# Patient Record
Sex: MALE | Race: WHITE | NOT HISPANIC OR LATINO
[De-identification: names, ages, dates, MRNs, and addresses within clinical notes are randomized per-mention and may not be internally consistent; named-entity substitution may affect disease eponyms.]

---
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
## 2024-05-31 ENCOUNTER — WALK IN (OUTPATIENT)
Dept: URGENT CARE | Age: 24
End: 2024-05-31
Attending: EMERGENCY MEDICINE

## 2024-05-31 VITALS
TEMPERATURE: 97 F | HEART RATE: 90 BPM | OXYGEN SATURATION: 97 % | SYSTOLIC BLOOD PRESSURE: 144 MMHG | DIASTOLIC BLOOD PRESSURE: 78 MMHG | RESPIRATION RATE: 16 BRPM

## 2024-05-31 DIAGNOSIS — H61.23 HEARING LOSS OF BOTH EARS DUE TO CERUMEN IMPACTION: Primary | ICD-10-CM

## 2024-05-31 ASSESSMENT — PAIN SCALES - GENERAL: PAINLEVEL: 0

## 2024-07-08 ENCOUNTER — HOSPITAL ENCOUNTER (EMERGENCY)
Facility: CLINIC | Age: 24
Discharge: HOME | End: 2024-07-08
Attending: STUDENT IN AN ORGANIZED HEALTH CARE EDUCATION/TRAINING PROGRAM | Admitting: STUDENT IN AN ORGANIZED HEALTH CARE EDUCATION/TRAINING PROGRAM
Payer: COMMERCIAL

## 2024-07-08 ENCOUNTER — TELEPHONE (OUTPATIENT)
Dept: BEHAVIORAL HEALTH | Facility: CLINIC | Age: 24
End: 2024-07-08

## 2024-07-08 VITALS
OXYGEN SATURATION: 96 % | DIASTOLIC BLOOD PRESSURE: 81 MMHG | HEART RATE: 89 BPM | BODY MASS INDEX: 27.4 KG/M2 | TEMPERATURE: 98.1 F | WEIGHT: 202.3 LBS | HEIGHT: 72 IN | RESPIRATION RATE: 18 BRPM | SYSTOLIC BLOOD PRESSURE: 112 MMHG

## 2024-07-08 DIAGNOSIS — F41.0 GENERALIZED ANXIETY DISORDER WITH PANIC ATTACKS: ICD-10-CM

## 2024-07-08 DIAGNOSIS — F33.1 MAJOR DEPRESSIVE DISORDER, RECURRENT EPISODE, MODERATE (H): ICD-10-CM

## 2024-07-08 DIAGNOSIS — F41.1 GENERALIZED ANXIETY DISORDER WITH PANIC ATTACKS: ICD-10-CM

## 2024-07-08 PROBLEM — F41.9 ANXIETY: Status: ACTIVE | Noted: 2024-07-08

## 2024-07-08 LAB
ATRIAL RATE - MUSE: 90 BPM
DIASTOLIC BLOOD PRESSURE - MUSE: NORMAL MMHG
INTERPRETATION ECG - MUSE: NORMAL
P AXIS - MUSE: 49 DEGREES
PR INTERVAL - MUSE: 136 MS
QRS DURATION - MUSE: 80 MS
QT - MUSE: 330 MS
QTC - MUSE: 403 MS
R AXIS - MUSE: 98 DEGREES
SYSTOLIC BLOOD PRESSURE - MUSE: NORMAL MMHG
T AXIS - MUSE: 21 DEGREES
VENTRICULAR RATE- MUSE: 90 BPM

## 2024-07-08 PROCEDURE — 93005 ELECTROCARDIOGRAM TRACING: CPT

## 2024-07-08 PROCEDURE — 99283 EMERGENCY DEPT VISIT LOW MDM: CPT

## 2024-07-08 PROCEDURE — 99244 OFF/OP CNSLTJ NEW/EST MOD 40: CPT | Performed by: PSYCHIATRY & NEUROLOGY

## 2024-07-08 PROCEDURE — 250N000013 HC RX MED GY IP 250 OP 250 PS 637: Performed by: PSYCHIATRY & NEUROLOGY

## 2024-07-08 RX ORDER — ESCITALOPRAM OXALATE 5 MG/1
5 TABLET ORAL DAILY
Status: DISCONTINUED | OUTPATIENT
Start: 2024-07-08 | End: 2024-07-08 | Stop reason: HOSPADM

## 2024-07-08 RX ORDER — ESCITALOPRAM OXALATE 5 MG/1
5 TABLET ORAL DAILY
Qty: 30 TABLET | Refills: 0 | Status: SHIPPED | OUTPATIENT
Start: 2024-07-08

## 2024-07-08 RX ADMIN — ESCITALOPRAM OXALATE 5 MG: 5 TABLET, FILM COATED ORAL at 15:43

## 2024-07-08 ASSESSMENT — ACTIVITIES OF DAILY LIVING (ADL)
ADLS_ACUITY_SCORE: 35
HYGIENE/GROOMING: INDEPENDENT
ADLS_ACUITY_SCORE: 35

## 2024-07-08 ASSESSMENT — COLUMBIA-SUICIDE SEVERITY RATING SCALE - C-SSRS
1. IN THE PAST MONTH, HAVE YOU WISHED YOU WERE DEAD OR WISHED YOU COULD GO TO SLEEP AND NOT WAKE UP?: NO
6. HAVE YOU EVER DONE ANYTHING, STARTED TO DO ANYTHING, OR PREPARED TO DO ANYTHING TO END YOUR LIFE?: NO
2. HAVE YOU ACTUALLY HAD ANY THOUGHTS OF KILLING YOURSELF IN THE PAST MONTH?: NO

## 2024-07-08 ASSESSMENT — PATIENT HEALTH QUESTIONNAIRE - PHQ9: SUM OF ALL RESPONSES TO PHQ9 QUESTIONS 1 & 2: 3

## 2024-07-08 NOTE — ED NOTES
[facility]  ED to EMPATH Checklist:      Goal for EMPATH: Anxiety management    Current Behavior: Calm and Cooperative    Safety Concerns: None    Legal Hold Status: Voluntary    Medically Cleared by ED provider: Yes    Patient Therapeutically Searched: Not searched - Currently in triage    Belongings: Remain with patient    Independent Ambulation at Baseline: Yes/No: Yes    Participates in Care/Conversation: Yes/No: Yes    Patient Informed about EMPATH: Yes/No: Yes    DEC: Ordered and pending    Patient Ready to be Transferred to EMPATH? Yes/No: Yes

## 2024-07-08 NOTE — ED PROVIDER NOTES
EmPATH Unit - Psychiatric Consultation  [facility] Emergency Department    Bruce Arriaga MRN: [id number]   Age: 23 year old YOB: 2000     History     Chief Complaint   Patient presents with    Mental Health Problem     HPI  Bruce Arriaga is a 23 year old male with history notable for no prior psychiatric history who presents to the emergency department reporting concern for chest tightness and anxiety.  He was determined to be medically stable and transferred to the EmPATH unit for psychiatric assessment.  Records also indicate that the patient has been consuming alcoholic beverages more often than usual.  There did not appear to be concerns related to the risk of withdrawal or dependency related to alcohol.  His last alcoholic beverage was about 2 days ago.  He was determined to be medically stable then transferred to the EmPATH unit for psychiatric assessment.  He is now approaching 5 hours in the emergency department.  On examination, the patient confirms that his primary struggle is related to anxiety.  He notes that his anxiety significantly worsened a couple weeks ago following a relational discord.  He also notes that alcohol usage has increased since then.  He notes that during moments of high anxiety, he experiences unusual physical manifestations that make him worry about his general health, recently leading to an evaluation in an outside emergency department.  Medical stability was reassured at that time however he has continued to experience chest tightness and shortness of breath which further escalated his anxiety.  He characterized depressive symptoms although less impairing when compared to anxiety.  His mood has been sad although symptom severity is not present on a daily basis.  Vegetative symptoms and anhedonia tend to fluctuate as well.  These symptoms have gradually become more prominent over the past 1 week and coincide with worsening anxiety.  He denied suicidal and  homicidal thoughts.  He presents today as help seeking, open to antidepressant treatments and psychotherapy.  There was no indication of psychosis.    Past Medical History  No past medical history on file.  No past surgical history on file.  escitalopram (LEXAPRO) 5 MG tablet      No Known Allergies  Family History  No family history on file.  Social History           Review of Systems  A medically appropriate review of systems was performed with pertinent positives and negatives noted in the HPI, and all other systems negative.    Physical Examination   BP: (!) 144/89  Pulse: 93  Temp: 98.1  F (36.7  C)  Resp: 14  Height: 182.9 cm (6')  Weight: 90.7 kg (200 lb)  SpO2: 96 %    Physical Exam  General: Appears stated age.   Neuro: Alert and fully oriented. Extremities appear to demonstrate normal strength on visual inspection.   Integumentary/Skin: no rash visualized, normal color    Psychiatric Examination   Appearance: awake, alert  Attitude:  cooperative  Eye Contact:  fair  Mood:  anxious and sad   Affect:  mood congruent  Speech:  clear, coherent  Psychomotor Behavior:  no evidence of tardive dyskinesia, dystonia, or tics  Thought Process:  logical and linear  Associations:  no loose associations  Thought Content:  no evidence of suicidal ideation or homicidal ideation and no evidence of psychotic thought  Insight:  fair  Judgement:  fair  Oriented to:  time, person, and place  Attention Span and Concentration:  fair  Recent and Remote Memory:  fair  Language: able to name/identify objects without impairment  Fund of Knowledge: intact with awareness of current and past events    ED Course     ED Course as of 07/08/24 1501   Mon Jul 08, 2024   1040 I obtained history and examined the patient as noted above.         Labs Ordered and Resulted from Time of ED Arrival to Time of ED Departure - No data to display    Assessments & Plan (with Medical Decision Making)   Patient presenting with progressively worsening  anxiety and depressive symptoms in the context of a relational discord further complicated by increased alcohol consumption.  His treatment plan focused on educating the patient on ongoing mental health symptoms and initiating antidepressant treatments. Nursing notes reviewed noting no acute issues.     I have reviewed the assessment completed by the [facility].     Preliminary diagnosis:    ICD-10-CM    1. Major depressive disorder, recurrent episode, moderate (H)  F33.1       2. Generalized anxiety disorder with panic attacks  F41.1     F41.0            Treatment Plan:  -Begin Lexapro 5 mg daily targeting antianxiety and antidepressant treatment.  Risks and benefits were reviewed.  -Referral for outpatient mental health treatment including medication management and psychotherapy  -Vital signs are fairly normal with the exception of a slightly high blood pressure.  Given his reported usage of alcohol, the risk of a withdrawal syndrome should be low.  Continue to monitor and treat accordingly.  -I offered to extend his stay on the EmPATH unit however the patient would prefer to discharge home today and utilize outpatient treatment.  Therefore, we will proceed with discharge back to the community.    --  Padilla Portillo MD   [facility] EMERGENCY DEPT  EmPATH Unit       Padilla Portillo MD  07/08/24 1053

## 2024-07-08 NOTE — TELEPHONE ENCOUNTER
Rec'd referral via Teams: [3:07 PM] Avery Huerta, may I get a TC therapy/ psychiatry referral for a patient [id number] ? Feel free to call EmPATH to schedule with patient. [phone]  Patient Preferences:   Location - in person / virtual  Provider Gender - no gender preference   Insurance: [facility]  Patient Email: none on file  Patient Phone: [phone]  IF FOR PSYCHIATRY  Next Medication Management Appointment  Date: 7/24/2024  Time: 6PM  Provider: [employer]    Called EMPATH to schedule with patient. Explained TC services to patient. Patient scheduled for TC therapy 7/9/2024. Next available TC Psychiatry is 7/18/2024 and patient appt is 7/24/2024, declined TC Psychiatry as appt for long term provider is following week.     Merlyn Young  Transition Clinic Coordinator  07/08/24 3:24 PM

## 2024-07-08 NOTE — PROGRESS NOTES
Discharge instructions reviewed with patient including follow-up care plan. Educated on medication regime and advised not to stop prescribed medication without consulting their physician. Reviewed safety plan and outpatient resources/appointments.Verbalized understanding of discharge instructions. Denies SI. All belongings which where brought into the hospital have been returned to patient. Escorted off the unit at 16:51. Discharged to home via private transportation.

## 2024-07-08 NOTE — ED PROVIDER NOTES
Emergency Department Note      History of Present Illness     Chief Complaint  Mental Health Problem      HPI  Bruce Arriaga is a very pleasant 23 year old male presenting with anxiety and chest tightness. The patient reports that while trying to fall asleep last night, he started to feel lightheaded and tremulous, making him nervous to fall asleep. Upon exam, the patient is now endorsing a tightness in his chest as well. He adds that he has been feeling more anxious with less energy recently, and he just went through a difficult break up on top of work and financial stress. Last weekend, he had 30 alcoholic drinks over the course of two days, he had two sober days, and then had 2 drinks every day for three days after that. He has not consumed alcohol yesterday or today. The patient is not normally a daily drinker, and denies every having alcohol withdrawal. He went to a hospital in [place] a week ago for the same chest tightness, and received a negative workup. He now believes his symptoms may just be due to his increasing anxiety. The patient occasionally uses marijuana.     Independent Historian  None. Only the patient provided history.    Review of External Notes  None.    Past Medical History   Medical History and Problem List  No past medical history on file.    Medications  No current outpatient medications on file.    Surgical History   No past surgical history on file.    Physical Exam   Patient Vitals for the past 24 hrs:   BP Temp Temp src Pulse Resp SpO2 Height Weight   07/08/24 1141 (!) 146/107 98.1  F (36.7  C) -- 91 16 98 % 1.829 m (6') 91.8 kg (202 lb 4.8 oz)   07/08/24 1041 (!) 144/89 -- -- -- -- -- -- --   07/08/24 1038 -- 98.1  F (36.7  C) Temporal 93 14 96 % 1.829 m (6') 90.7 kg (200 lb)      Physical Exam   General: Alert, young male, appearing stated age, sitting in chair, answering questions appropriately, tearful  HENT: Atraumatic, normocephalic  Eyes: Extraocular movements  intact  Cardiovascular: Regular rate  Pulmonary: Easy work of breathing  Skin: Warm and dry  Neuro: Alert and oriented  Psych: Cooperative, sad affect, anxious mood, denies suicidal ideation    Diagnostics   Lab Results   Labs Ordered and Resulted from Time of ED Arrival to Time of ED Departure - No data to display    Imaging  No orders to display     EKG   ECG taken at 1050, ECG read at 1100  Normal sinus rhythm with sinus arrhythmia  Rightward axis  Nonspecific T wave abnormality [T wave inversions in leads III and II]  Rate 90 bpm. SC interval 136 ms. QRS duration 80 ms. QT/QTc 330/403 ms. P-R-T axes 49 98 21.  My interpretation    Independent Interpretation  None    ED Course    Medications Administered  Medications - No data to display    Procedures  Procedures   None performed    Discussion of Management  None    Social Determinants of Health adding to complexity of care  None.      ED Course  ED Course as of 07/08/24 1145   Mon Jul 08, 2024   1040 I obtained history and examined the patient as noted above.         Medical Decision Making / Diagnosis   CMS Diagnoses: None    MIPS     None    MDM  Patient presenting with anxiety and chest tightness .  Vital signs are stable.   Given patient had an evaluation for chest tightness at an outside hospital 7 days ago, is 23, with no medical problems, low suspicion for organic etiology of his chest tightness.  Did obtain EKG to evaluate for cardiac arrhythmia and this was reassuring.  Suspect patient's chest tightness is secondary to anxiety.  I feel the patient is medically clear for mental health assessment. They are appropriate for transfer to EmPath unit for further evaluation and management.    Critical Care time was 0 minutes for this patient excluding procedures.    Disposition  The patient was transferred to EmPATH.     ICD-10 Codes:    ICD-10-CM    1. Anxiety  F41.9         Scribe Disclosure:  I, Heather Santa, am serving as a scribe at 11:18 AM on 7/8/2024  to document services personally performed by Andrea Garcia MD based on my observations and the provider's statements to me.    Andrea Garcia MD  [employer]        Andrea Garcia MD  07/08/24 8298

## 2024-07-08 NOTE — DISCHARGE INSTRUCTIONS
Your Upcoming Appointment    Type: Therapy - Initial (In-Person)  Date: Tuesday, 7/16/2024  Time: 6:00 pm - 6:55 pm  Provider: Loretta Mejia MA  LMFT  Location: [facility], [address], [address], [place] [address]  Phone: [phone]  ---------------------------------------------------------------------  Type: Medication Mgmt - Initial (In-Person)  Date: Wednesday, 7/24/2024  Time: 6:00 pm - 7:00 pm  Provider: Kathryn SABILLON  CNP,PMHNP,RN  Location: [facility], [address] [place] [address], [address], [place] [address]  Phone: [phone]  Patient Instructions: Thank you for choosing [facility]! *Due to high demand, your appt date & time is only guaranteed after you speak with [facility] intake. Our intake team will attempt to contact you. If you do not hear from them within 24 hours, please call them at [phone] and tell them you are a [facility] referral. If you do not speak with our Intake Department and complete the necessary paperwork they send you, we cannot see you at your scheduled appointment time. Thank you for helping us to help you!    Transition Clinic Referral: We have completed a referral to the transition clinic for therapy and it is scheduled for 7/10/2024 at 12:30pm with Anne Marie. Please Note: This is a virtual visit; there is no need to come to the facility.         Mental health recommendations    Please follow-up with your outpatient team about your visit today.    2.  One of our care coordinators will reach out to you after your discharge.  If you have any questions about additional resources please call our coordinators at # [phone]    3.  Please use aftercare plan and already established coping skills and safety planning as needed.     4.  Please call 911 and/or return to the emergency department if her symptoms worsen or your safety becomes compromised.       The following DBT skills can assist me when: I want to act on your emotions and acting on them  will only make things worse, I am overwhelmed by my emotions, I want to try to be skillful    Reduce Extreme Emotion  QUICKLY:  Changing Your Body Chemistry       T:  Change your body Temperature to change your autonomic nervous system    Use Ice pack to calm yourself down FAST. Place ice pack underneath your eyes for a count of 30 seconds to initiate the divers reflex which will naturally calm down your heart rate and breathing.      I:  Intensely exercise to calm down a body revved up by emotion    Examples: running, walking fast, jumping, playing basketball, weight lifting, swimming, calisthenics, etc.      Engage in exercises that DO NOT include violent behaviors. Exercises that utilize violent behaviors tend to function as  behavioral rehearsal,  and rather than calming the person down, may actually  rev  the person up more, increasing the likelihood of violence, and lessening the likelihood that they will  burn off  energy     P:  Progressively relax your muscles    Starting with your hands, moving to your forearms, upper arms, shoulders, neck, forehead, eyes, cheeks and lips, tongue and teeth, chest, upper back, stomach, buttocks, thighs, calves, ankles, feet      Tense (10 seconds,   of the way), then relax each muscle (all the way)    Notice the tension    Notice the difference when relaxed (by tensing first, and then relaxing, you are able to get a more thorough relaxation than by simply relaxing)      P: Paced breathing to relax    The standard technique is to begin with counting the number of steps one takes for a typical inhale, then counting the steps one takes for a typical exhale, and then lengthening the amount of steps for the exhalation by one or two steps.  OR repeat this pattern for 1-2 minutes:   Inhale for four (4) seconds    Exhale for six (6) to eight (8) seconds        After using Distress Tolerance TIPP, TRY TO STOP!     S- Stop    Do not just react on your emotion urge. Stop! Freeze! Do  not move a muscle! Your emotions may try to make you act without thinking. Stay in control! Take a step back Take a step back from the situation.    T- Take a break    Let go. Take a deep breath. Do not let your feelings make you act impulsively.    O- Observe    Notice what is going on inside and outside you. What is the situation? What are your thoughts and feelings? What are others saying or doing? Does my emotion make sense, is it justified? What is it that my emotions want me to do? Would that be effective?    P- Proceed mindfully    Act with awareness. In deciding what to do, consider your thoughts and feelings, the situation, and other people s thoughts and feelings. Think about your goals. Ask Wise Mind: Which actions will make it better or worse?        If my emotion action urge would not be effective or helpful, practice acting OPPOSITE to the EMOTION ACTION URGE can help reduce the intensity or even change the emotion.   Consider these examples: with FEAR we have the urge to run away/avoid. OPPOSITE would be to approach it with caution. ANGER we have the urge to attack. OPPOSITE would be to gently avoid or to demonstrate kindness towards it. SADNESS we have the urge to withdraw/isolate. OPPOSITE would be to get self to move and be active physically or socially.      These additional skills may help with self-soothing and distracting you:      Activities   Focus attention on a task you need to get done. Rent movies; watch TV. Clean a room in your house. Find an event to go to. Play computer games. Go walking. Exercise. Surf the Internet. Write e-mails. Play sports. Go out for a meal or eat a favorite food. Call or go out with a friend. Listen to your iPod; download music. Build something. Spend time with your children. Play cards. Read magazines, books, comics. Do crossword puzzles or Sudoku.     Emotions   Read emotional books or stories, old letters. Watch emotional TV shows; go to emotional movies.  Listen to emotional music. (Be sure the event creates different emotions.) Ideas: Scary movies, joke books, comedies, funny records, Bahai music, soothing music or music that fires you up, going to a store and reading funny greeting cards.     Thoughts   Count to 10; count colors in a painting or poster or out the window; count anything. Repeat words to a song in your mind. Work puzzles. Watch TV or read.     Sensations   Squeeze a rubber ball very hard. Listen to very loud music. Hold ice in your hand or mouth. Go out in the rain or snow. Take a hot or cold shower.   Remember that you can use your 5 senses as helpful self-soothing tools!       I can help my own emotions by practicing the following to keep my emotional mind healthy and bring positive emotions:     The ABC PLEASE skill is about taking good care of ourselves so that we can take care of others. Also, an important component of DBT is to reduce our vulnerability. When we take good care of ourselves, we are less likely to be vulnerable to disease and emotional crisis.     ABC   A- Accumulate positive emotions by doing things that are pleasant.   B- Build mastery by doing things we enjoy. Whether it is reading, cooking, cleaning, fixing a car, working a cross word puzzle, or playing a musical instrument. Practice these things to  and in time we feel competent.   C- [place] Ahead by rehearsing a plan ahead of time so that we can be prepared to cope skillfully. (Think of what makes situations difficult, and what helps in those situations)      PLEASE   Treat Physical Illness and take medications as prescribed.   Balance eating in order to avoid mood swings.   Avoid mood-Altering substances and have mood control.   Maintain good sleep so you can enjoy your life.   Get exercise to maintain high spirits.        Aftercare Plan    If I am feeling unsafe or I am in a crisis, I will:   Contact my established care providers   Call the National Suicide  Prevention Lifeline: 212.942.2866   Go to the nearest emergency room   Call 911   Your [facility] has a mental health crisis team you can call 24/7: [facility] Adult, [phone]    Warning signs that I or other people might notice when a crisis is developing for me: crying, feeling bad about self    Things I am able to do on my own to cope or help me feel better:   -Practice square breathing when I begin to feel anxious - in breath through the nose for the count   of 4 and the first line on the square. Out breath through the mouth for the count of 4 for the second line   of the square. Repeat to complete the square. Repeat the square as many times as needed.    Things that I am able to do with others to cope or help me feel better: -Use community resources, including hotline numbers, [facility] crisis and support meetings    Things I can use or do for distraction: -Distraction skills of: going for walks, watching TV, spending time outside, calling a friend or family member  -Download a meditation herbert and spend 15-20 minutes per day mediating/relaxing. Some apps to   download include: Calm, Headspace and Insight Timer. All 3 of these apps have free version    Changes I can make to support my mental health and wellness:   -Attend scheduled mental health therapy and psychiatric appointments and follow all recommendations  -Maintain a daily schedule/routine  -Practice deep breathing skills  -Abstain from all mood altering chemicals not currently prescribed to me     People in my life that I can ask for help: [employer] [place] [employer] Mental Illness (LAI)  094.615.6641 or 1.888.LAI.HELPS    Other things that are important when I m in crisis: -Commit to 30 minutes of self care daily - this can be as simple as taking a shower, going for a walk, cooking a meal, read, writing, etc        Crisis Lines  Crisis Text Line  Text 303266  You will be connected with a trained live crisis counselor to provide support.    Gordon hammonds  "texto  TONEY a 281911 o texto a 442-AYUDAME en WhatsApp    National Hope Line  1.800.SUICIDE [1642574]      Community Resources  Fast Tracker  Linking people to mental health and substance use disorder resources  [employer]     [place] Mental Health Warm Line  Peer to peer support  Monday thru Saturday, 12 pm to 10 pm  555.165.6872 or 1.404.902.9837  Text \"Support\" to 61150    National [place] on Mental Illness (LAI)  710.685.4851 or 1.888.LAI.HELPS        Mental Health Apps  My3  https://[employer].org/    VirtualHopeBox  https://SeptRx/apps/virtual-hope-box/      Additional Information  Today you were seen by a licensed mental health professional through Triage and Transition services, Behavioral Healthcare Providers ([facility])  for a crisis assessment in the Emergency Department at [facility].  It is recommended that you follow up with your established providers (psychiatrist, mental health therapist, and/or primary care doctor - as relevant) as soon as possible. Coordinators from [facility] will be calling you in the next 24-48 hours to ensure that you have the resources you need.  You can also contact [facility] coordinators directly at [phone]. You may have been scheduled for or offered an appointment with a mental health provider. [facility] maintains an extensive network of licensed behavioral health providers to connect patients with the services they need.  We do not charge providers a fee to participate in our referral network.  We match patients with providers based on a patient's specific needs, insurance coverage, and location.  Our first effort will be to refer you to a provider within your care system, and will utilize providers outside your care system as needed.       "

## 2024-07-08 NOTE — ED TRIAGE NOTES
"Insomnia for past couple weeks feeling \"shaky.\" Increased anxiety. Denies SI/HI.  New break up recently reports as stressor.      Triage Assessment (Adult)       Row Name 07/08/24 1039          Triage Assessment    Airway WDL WDL        Respiratory WDL    Respiratory WDL WDL        Skin Circulation/Temperature WDL    Skin Circulation/Temperature WDL WDL        Cardiac WDL    Cardiac WDL WDL        Peripheral/Neurovascular WDL    Peripheral Neurovascular WDL WDL                     "

## 2024-07-08 NOTE — ED NOTES
Bruce is a very pleasant young man who arrives with increased anxiety, depression after a break up with his girlfriend 3 weeks ago.  He said he was doing fine for the first two weeks, but last week when he went to [place] to a cabin with his friends he had chest tightness . He also had several drinks over the weekend, he normally does not drink.   Last night he started to feel light headed and tremulous and he came to the ER.    Today patient present with increased anxiety, crying and feeling very emotional.  He is here with the ex girlfriend.  Denies any suicidal ideation.    .Nursing assessment complete including patient and medication profiles. Risk assessments completed addressing suicide,fall,skin,nutrition and safety issues. Care plan initiated. Assessments reviewed with physician and admit orders received. Video monitoring in progress, Patient Informed.

## 2024-07-09 ENCOUNTER — TELEPHONE (OUTPATIENT)
Dept: BEHAVIORAL HEALTH | Facility: CLINIC | Age: 24
End: 2024-07-09
Payer: COMMERCIAL

## 2024-07-10 ENCOUNTER — VIRTUAL VISIT (OUTPATIENT)
Dept: BEHAVIORAL HEALTH | Facility: CLINIC | Age: 24
End: 2024-07-10
Payer: COMMERCIAL

## 2024-07-10 DIAGNOSIS — F41.9 ANXIETY: ICD-10-CM

## 2024-07-10 DIAGNOSIS — F33.1 MAJOR DEPRESSIVE DISORDER, RECURRENT EPISODE, MODERATE WITH ANXIOUS DISTRESS (H): Primary | ICD-10-CM

## 2024-07-10 PROCEDURE — 99207 PR DROP WITH A PROCEDURE: CPT | Mod: 95

## 2024-07-10 ASSESSMENT — COLUMBIA-SUICIDE SEVERITY RATING SCALE - C-SSRS
TOTAL  NUMBER OF ABORTED OR SELF INTERRUPTED ATTEMPTS SINCE LAST CONTACT: NO
1. SINCE LAST CONTACT, HAVE YOU WISHED YOU WERE DEAD OR WISHED YOU COULD GO TO SLEEP AND NOT WAKE UP?: NO
TOTAL  NUMBER OF INTERRUPTED ATTEMPTS SINCE LAST CONTACT: NO
ATTEMPT SINCE LAST CONTACT: NO
SUICIDE, SINCE LAST CONTACT: NO
6. HAVE YOU EVER DONE ANYTHING, STARTED TO DO ANYTHING, OR PREPARED TO DO ANYTHING TO END YOUR LIFE?: NO
2. HAVE YOU ACTUALLY HAD ANY THOUGHTS OF KILLING YOURSELF?: NO

## 2024-07-10 ASSESSMENT — ANXIETY QUESTIONNAIRES
5. BEING SO RESTLESS THAT IT IS HARD TO SIT STILL: NOT AT ALL
2. NOT BEING ABLE TO STOP OR CONTROL WORRYING: SEVERAL DAYS
GAD7 TOTAL SCORE: 5
GAD7 TOTAL SCORE: 5
6. BECOMING EASILY ANNOYED OR IRRITABLE: NOT AT ALL
7. FEELING AFRAID AS IF SOMETHING AWFUL MIGHT HAPPEN: SEVERAL DAYS
3. WORRYING TOO MUCH ABOUT DIFFERENT THINGS: SEVERAL DAYS
1. FEELING NERVOUS, ANXIOUS, OR ON EDGE: SEVERAL DAYS

## 2024-07-10 ASSESSMENT — PATIENT HEALTH QUESTIONNAIRE - PHQ9
5. POOR APPETITE OR OVEREATING: SEVERAL DAYS
SUM OF ALL RESPONSES TO PHQ QUESTIONS 1-9: 5

## 2024-07-10 NOTE — PROGRESS NOTES
"    Transition Clinic - Initial Visit Progress Note    Patient  Name: Bruce Arriaga, : 2000    Date:  7/10/2024       Service Type:  Mental Health Initial Visit       No data recorded Start:  12:30pm No data recorded End: 1:00pm    Session Length:   TC Session Length: 30 (16-37 Minutes)    Visit #: 1    Attendees:  TC Attendees: Client alone    Service Modality:  Service Modality: Video Visit:      Provider verified identity through the following two step process.  Patient provided:  Patient  and Patient address    Telemedicine Visit: The patient's condition can be safely assessed and treated via synchronous audio and visual telemedicine encounter.      Reason for Telemedicine Visit: Patient convenience (e.g. access to timely appointments / distance to available provider)    Originating Site (Patient Location): Patient's home    Distant Site (Provider Location): [facility]    Consent:  The patient/guardian has verbally consented to: the potential risks and benefits of telemedicine (video visit) versus in person care; bill my insurance or make self-payment for services provided; and responsibility for payment of non-covered services.     Patient would like the video invitation sent by:  My Chart    Mode of Communication:  Video Conference via [employer]    Distant Location (Provider):  Off-site    As the provider I attest to compliance with applicable laws and regulations related to telemedicine.    Source of Referral:  Other \"ED/DEC [facility]reet[facility] P[facility]\"      Informed Consent and Assessment Methods    This provider and patient discussed HIPAA, and the limits of confidentiality; including mandated reporting, the possibility of collaborative discussions with patient's primary care provider and the multidisciplinary team in the MH clinic during consultation.  Discussed the no show policy, and the benefits and possible unintended consequences of therapy.  " "Patient indicated understanding Transition Clinic services are short term, solution focused, until a referral can be made and patient can bridge to long term therapy.  Patient verbally indicated understanding the informed consent.         Presenting Concerns/  Current Stressors:  Bruce Arriaga is a 23 year old who was referred to the Transition Clinic by \"ALVINA/LATOYA Stein\" for \"bridge therapy until appointment next week 7/16/24\", per chart review and pt report.  Pt recently seen in the ED/DEC/Empath by   \"ALVINA/LATOYA Stein\" with rec for individual therapy and medication management, per chart review.    Next LOC: Per chart review, Pt with long-term therapy setup prior to this appt [facility] and medication management with \"[facility], 7/24/24 at 6pm\", per chart review and pt report.      Initial Session:  Writer introduced transition clinic services as transitional, short term, brief, solution-focused therapy, until connected/transition to next LOC.    Bruce Arriaga reports improved sxs since ED/Empath 7/8/24, reports things are going fine.  Pt reports long term therapy and medication management appointments setup for next week.      Per chart review, pt with a hx of MDD and ROGELIO, no notable psych hosp, with information available in epic ehr at the time of this note.  Pt today, presents with depression and anxiety sxs, GAD7 and PHQ9 screeners completed, he reports situational stressors related to relational and job.  Pt denies current SI, plan, intent, SIB, HI, AH/VH, and/or janina sxs, at this point in time.  CSSR completed.  Pt reports he is hopeful, looking forward to summer.  Pt reports medication compliance, adequate supports, and denies current substance use.  Pt reports the following protective factors:  access to variety of clinical interventions, supportive family, hopeful, future-oriented, absence of psych hosp hx, willingness to " engage in therapy, strong bond to family unit, community support, or employment, responsibilities and duties to others, including pets and children, sense of importance of health and wellness, help seeking, able to access care without barriers, lives in a responsibly safe and stable environment.    Pt processed regarding improved sxs.  Pt reports historical coping skills of soccor, rock climbing, gym.  Writer and pt explored additional coping skills including urge surfing handout with focus on surfing through emotions, and mindfulness with focus on present moment, mindfulness walk and body scan.  Pt appeared engaged and receptive to the above interventions.    Pt requested to discharge transitional services today as he reports next LOC next week with [facility], congruent in [facility].  Pt declined any follow up appointments or needs.  Pt can call Transition clinic any time to schedule at [phone].    ASSESSMENT:    Required Screenings: The following assessments were completed by patient for this visit:        7/10/2024     1:05 PM   PHQ   PHQ-9 Total Score 5   Q9: Thoughts of better off dead/self-harm past 2 weeks Not at all        GAD7:       7/10/2024    12:37 PM   ROGELIO-7 SCORE   Total Score 5     CAGE-AID:       7/10/2024    12:37 PM   CAGE-AID Total Score   Total Score 1     PROMIS 10-Global Health (all questions and answers displayed):       7/10/2024    12:37 PM   PROMIS 10   In general, would you say your health is: 3   In general, would you say your quality of life is: 3   In general, how would you rate your physical health? 3   In general, how would you rate your mental health, including your mood and your ability to think? 2   In general, how would you rate your satisfaction with your social activities and relationships? 3   In general, please rate how well you carry out your usual social activities and roles. (This includes activities at home, at work and in your community, and  responsibilities as a parent, child, spouse, employee, friend, etc.) 2   To what extent are you able to carry out your everyday physical activities such as walking, climbing stairs, carrying groceries, or moving a chair? 5   In the past 7 days, how often have you been bothered by emotional problems such as feeling anxious, depressed, or irritable? 3   In the past 7 days, how would you rate your fatigue on average? 2   In the past 7 days, how would you rate your pain on average, where 0 means no pain, and 10 means worst imaginable pain? 1   Global Mental Health Score 11   Global Physical Health Score 16   PROMIS TOTAL - SUBSCORES 27     [place] Suicide Severity Rating Scale (Lifetime/Recent)      7/8/2024    10:40 AM 7/8/2024    11:43 AM 7/8/2024     1:40 PM   [place] Suicide Severity Rating (Lifetime/Recent)   Q1 Wish to be Dead (Lifetime)  No No   Q2 Non-Specific Active Suicidal Thoughts (Lifetime)  No No   Q1 Wished to be Dead (Past Month) 0-->no 0-->no    Q2 Suicidal Thoughts (Past Month) 0-->no 0-->no    Q6 Suicide Behavior (Lifetime) 0-->no 0-->no 0-->no   Level of Risk per Screen no risks indicated no risks indicated    Actual Attempt (Lifetime)   N   Has subject engaged in non-suicidal self-injurious behavior? (Lifetime)   N   Interrupted Attempts (Lifetime)   N   Aborted or Self-Interrupted Attempt (Lifetime)   N   Preparatory Acts or Behavior (Lifetime)   N   Calculated C-SSRS Risk Score (Lifetime/Recent)   No Risk Indicated     [place] Suicide Severity Rating Scale (Short Version)      7/8/2024    10:40 AM 7/8/2024    11:43 AM 7/8/2024     1:40 PM 7/10/2024     1:00 PM   [place] Suicide Severity Rating (Short Version)   Q1 Wished to be Dead (Past Month) 0-->no 0-->no     Q2 Suicidal Thoughts (Past Month) 0-->no 0-->no     Q6 Suicide Behavior (Lifetime) 0-->no 0-->no 0-->no    Level of Risk per Screen no risks indicated no risks indicated     1. Wish to be Dead (Since Last Contact)    N   2.  Non-Specific Active Suicidal Thoughts (Since Last Contact)    N   Actual Attempt (Since Last Contact)    N   Has subject engaged in non-suicidal self-injurious behavior? (Since Last Contact)    N   Interrupted Attempts (Since Last Contact)    N   Aborted or Self-Interrupted Attempt (Since Last Contact)    N   Preparatory Acts or Behavior (Since Last Contact)    N   Suicide (Since Last Contact)    N   Calculated C-SSRS Risk Score (Since Last Contact)    No Risk Indicated       Mental Status Assessment:  Appearance:   Appropriate   Eye Contact:   Good   Psychomotor Behavior: Normal   Attitude:   Cooperative   Orientation:   All  Speech     Rate / Production:  Normal/ Responsive     Volume:   Normal   Mood:    Normal  Affect:    Appropriate   Thought Content:  Clear   Thought Form:  Coherent   Insight:    Good       Safety Issues and Plan for Safety and Risk Management:     Patient denies current fears or concerns for personal safety.  Patient denies current or recent suicidal ideation or behaviors.  Patient denies current or recent homicidal ideation or behaviors.  Patient denies current or recent self injurious behavior or ideation.  Patient denies other safety concerns.  Recommended that patient call 911 or go to the local ED should there be a change in any of these risk factors.       Diagnostic Criteria:  Major Depressive Disorder  CRITERIA (A-C) REPRESENT A MAJOR DEPRESSIVE EPISODE - SELECT THESE CRITERIA  A) Recurrent episode(s) - symptoms have been present during the same 2-week period and represent a change from previous functioning 5 or more symptoms (required for diagnosis)   - Depressed mood. Note: In children and adolescents, can be irritable mood.     - Diminished interest or pleasure in all, or almost all, activities.    - Significant weight /appetite changes.    - sleep changes/ sleep.    - Fatigue or loss of energy.     DSM5 Diagnoses: (Sustained by DSM5 Criteria Listed Above)  Diagnoses: 296.32  (F33.1) Major Depressive Disorder, Recurrent Episode, Moderate _ and With anxious distress  Psychosocial & Contextual Factors: Pt reports recent situational stressors.      Intervention:   Solution Focused Brief Therapy   Brief Psychotherapy - discussed and prioritizing the most efficient treatment., Cognitive Behavioral Therapy (CBT) - Discussed changing thoughts is the path to changing behaviors and feelings., Mindfulness Therapy - Cultivation of present-oriented, non-judgmental attitude. It helps nurtures great awareness, clarity, and acceptance of reality., Motivational Interviewing - helping to find the motivation to make positive behavior changes., and Person-Centered Therapy - Actualizes potential and moves towards increased awareness, spontaneity, trust in self and inner directedness.    Collateral Reports Completed:  TC Collateral: [facility] electronic medical records reviewed.    DATA:  Interactive Complexity: No  Crisis: No    PLAN: (Homework, other):  Provider will continue Diagnostic Assessment. Initial Treatment will focus on: Depressed Mood - anxiety .  2.   Information in this assessment was obtained from the medical record and provided by patient who is a good historian.   3.   Follow up scheduled:  Pt requested to discharge today as next LOC next week 7/16/24.  Pt declined any follow-up needs upon inquiry.       4.  Anticipated Discharge: Anticipated Discharge Time: < 1 Month   5. Is this the patient's last discharge: Yes. Reason for Discharge Successfully connected with next level of care Level of care: Outpatient Counseling / Psychiatry    [facility] 7/16/24 @ 6pm  [facility] medication management 7/24/24 @ 6pm      Procedure Code:  Psychotherapy (with patient) - 30  [CPT 76949]    RENEE BEARDEN DAV    Suicide Risk and Safety Concerns were assessed for. Patient meets the following risk assessment and triage: Patient has no change in safety concerns. Committed to safety and  agreed to follow previously developed safety plan.  Reviewed safety plan with pt, pt agrees to follow, safety plan available , safety plan in this note below and in pts [place].    [place] Safety Plan  Creation Date: 7/8/24  Step 1: Warning signs:  Warning Signs  Increased stress  Lack of sleep  Increased anxiety  Step 2: Internal coping strategies - Things I can do to take my mind off my problems without  contacting another person:  Strategies  activities I enjoy/ hobbies  distractions- music, tv, movies etc.  Step 3: People and social settings that provide distraction:  Name Contact Information  Friends  Family  Step 4: People whom I can ask for help during a crisis:  Name Contact Information  Family- mom  Step 5: Professionals or agencies I can contact during a crisis:  Clinician/Agency Name Phone Emergency Contact  Crisis lines  Suicide Prevention Lifeline Phone: Call or Text 924  Crisis Text Line: Text HOME to 135931  Step 6: Making the environment safer (plan for lethal means safety):  Did not identify any lethal methods  Optional: What is most important to me and worth living for?:  [place] Safety Plan. Hellen Engel and Charles Cardenas. Used with permission of the  authors.

## 2024-10-06 ENCOUNTER — HEALTH MAINTENANCE LETTER (OUTPATIENT)
Age: 24
End: 2024-10-06